# Patient Record
Sex: FEMALE | Race: WHITE | Employment: STUDENT | URBAN - METROPOLITAN AREA
[De-identification: names, ages, dates, MRNs, and addresses within clinical notes are randomized per-mention and may not be internally consistent; named-entity substitution may affect disease eponyms.]

---
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
## 2023-02-24 LAB
ALANINE AMINOTRANSFERASE (SGPT) (U/L) IN SER/PLAS: 15 U/L (ref 7–45)
ASPARTATE AMINOTRANSFERASE (SGOT) (U/L) IN SER/PLAS: 20 U/L (ref 9–39)
BASOPHILS (10*3/UL) IN BLOOD BY AUTOMATED COUNT: 0.05 X10E9/L (ref 0–0.1)
BASOPHILS/100 LEUKOCYTES IN BLOOD BY AUTOMATED COUNT: 0.8 % (ref 0–2)
EOSINOPHILS (10*3/UL) IN BLOOD BY AUTOMATED COUNT: 0.2 X10E9/L (ref 0–0.7)
EOSINOPHILS/100 LEUKOCYTES IN BLOOD BY AUTOMATED COUNT: 3.1 % (ref 0–6)
ERYTHROCYTE DISTRIBUTION WIDTH (RATIO) BY AUTOMATED COUNT: 11.9 % (ref 11.5–14.5)
ERYTHROCYTE MEAN CORPUSCULAR HEMOGLOBIN CONCENTRATION (G/DL) BY AUTOMATED: 34.4 G/DL (ref 32–36)
ERYTHROCYTE MEAN CORPUSCULAR VOLUME (FL) BY AUTOMATED COUNT: 86 FL (ref 80–100)
ERYTHROCYTES (10*6/UL) IN BLOOD BY AUTOMATED COUNT: 4.41 X10E12/L (ref 4–5.2)
HEMATOCRIT (%) IN BLOOD BY AUTOMATED COUNT: 37.8 % (ref 36–46)
HEMOGLOBIN (G/DL) IN BLOOD: 13 G/DL (ref 12–16)
IMMATURE GRANULOCYTES/100 LEUKOCYTES IN BLOOD BY AUTOMATED COUNT: 0.2 % (ref 0–0.9)
LEUKOCYTES (10*3/UL) IN BLOOD BY AUTOMATED COUNT: 6.5 X10E9/L (ref 4.4–11.3)
LYMPHOCYTES (10*3/UL) IN BLOOD BY AUTOMATED COUNT: 2.53 X10E9/L (ref 1.2–4.8)
LYMPHOCYTES/100 LEUKOCYTES IN BLOOD BY AUTOMATED COUNT: 39 % (ref 13–44)
MONOCYTES (10*3/UL) IN BLOOD BY AUTOMATED COUNT: 0.43 X10E9/L (ref 0.1–1)
MONOCYTES/100 LEUKOCYTES IN BLOOD BY AUTOMATED COUNT: 6.6 % (ref 2–10)
NEUTROPHILS (10*3/UL) IN BLOOD BY AUTOMATED COUNT: 3.27 X10E9/L (ref 1.2–7.7)
NEUTROPHILS/100 LEUKOCYTES IN BLOOD BY AUTOMATED COUNT: 50.3 % (ref 40–80)
PLATELETS (10*3/UL) IN BLOOD AUTOMATED COUNT: 303 X10E9/L (ref 150–450)
TRIGLYCERIDE (MG/DL) IN SER/PLAS: 119 MG/DL (ref 0–149)

## 2023-02-25 LAB
FLU A RESULT: NOT DETECTED
FLU B RESULT: NOT DETECTED
GROUP A STREP, PCR: NOT DETECTED
SARS-COV-2 RESULT: NOT DETECTED

## 2023-04-27 DIAGNOSIS — Z30.09 BIRTH CONTROL COUNSELING: Primary | ICD-10-CM

## 2023-04-27 RX ORDER — NORGESTIMATE AND ETHINYL ESTRADIOL 7DAYSX3 LO
KIT ORAL
Qty: 84 TABLET | Refills: 3 | Status: SHIPPED | OUTPATIENT
Start: 2023-04-27 | End: 2024-04-01

## 2023-05-23 LAB
ALANINE AMINOTRANSFERASE (SGPT) (U/L) IN SER/PLAS: 15 U/L (ref 7–45)
ASPARTATE AMINOTRANSFERASE (SGOT) (U/L) IN SER/PLAS: 18 U/L (ref 9–39)
TRIGLYCERIDE (MG/DL) IN SER/PLAS: 79 MG/DL (ref 0–149)

## 2023-09-15 ENCOUNTER — TELEPHONE (OUTPATIENT)
Dept: PRIMARY CARE | Facility: CLINIC | Age: 23
End: 2023-09-15
Payer: COMMERCIAL

## 2023-09-15 NOTE — TELEPHONE ENCOUNTER
She is inquiring about paper work that was dropped off for off campus housing. She said it has been about a month.

## 2024-03-30 DIAGNOSIS — Z30.09 BIRTH CONTROL COUNSELING: ICD-10-CM

## 2024-04-01 RX ORDER — NORGESTIMATE AND ETHINYL ESTRADIOL 7DAYSX3 LO
KIT ORAL
Qty: 84 TABLET | Refills: 2 | Status: SHIPPED | OUTPATIENT
Start: 2024-04-01

## 2025-01-29 ENCOUNTER — APPOINTMENT (OUTPATIENT)
Dept: PRIMARY CARE | Facility: CLINIC | Age: 25
End: 2025-01-29
Payer: COMMERCIAL

## 2025-01-29 VITALS
RESPIRATION RATE: 14 BRPM | BODY MASS INDEX: 23.78 KG/M2 | SYSTOLIC BLOOD PRESSURE: 118 MMHG | DIASTOLIC BLOOD PRESSURE: 70 MMHG | HEART RATE: 72 BPM | HEIGHT: 66 IN | WEIGHT: 148 LBS | TEMPERATURE: 98 F

## 2025-01-29 DIAGNOSIS — E55.9 VITAMIN D DEFICIENCY: ICD-10-CM

## 2025-01-29 DIAGNOSIS — R53.83 OTHER FATIGUE: ICD-10-CM

## 2025-01-29 DIAGNOSIS — Z00.00 WELLNESS EXAMINATION: ICD-10-CM

## 2025-01-29 DIAGNOSIS — J45.990 EXERCISE-INDUCED ASTHMA (HHS-HCC): Primary | ICD-10-CM

## 2025-01-29 PROCEDURE — 1036F TOBACCO NON-USER: CPT | Performed by: INTERNAL MEDICINE

## 2025-01-29 PROCEDURE — 3008F BODY MASS INDEX DOCD: CPT | Performed by: INTERNAL MEDICINE

## 2025-01-29 PROCEDURE — 99395 PREV VISIT EST AGE 18-39: CPT | Performed by: INTERNAL MEDICINE

## 2025-01-29 ASSESSMENT — PATIENT HEALTH QUESTIONNAIRE - PHQ9
2. FEELING DOWN, DEPRESSED OR HOPELESS: NOT AT ALL
1. LITTLE INTEREST OR PLEASURE IN DOING THINGS: NOT AT ALL
SUM OF ALL RESPONSES TO PHQ9 QUESTIONS 1 AND 2: 0

## 2025-01-29 NOTE — ADDENDUM NOTE
Addended by: NEHA DE LA PAZ on: 1/29/2025 03:13 PM     Modules accepted: Orders     Left message for patients parents, informing them that patient needs to do a remote transmission on loop recorder. Patient was informed that they can send transmission anytime before Monday.

## 2025-01-29 NOTE — PROGRESS NOTES
"Subjective    Naye Campbell is a 24 y.o. female who presents for Annual Exam.  HPI    No concerns  Graduated college at [employer].  She is working for the scene   Lives in [place] with her sister in [facility].   Periods are regular.  Is not currently sexually active  So she stopped taking her birth control  Her cramps are significant so she may want to go back on at some point  She had her gardasil shots.  Mood is good  Weight is good  Tired wants b12 checked  Review of Systems   All other systems reviewed and are negative.        Objective     /70 (BP Location: Left arm, Patient Position: Sitting, BP Cuff Size: Adult)   Pulse 72   Temp 36.7 °C (98 °F) (Skin)   Resp 14   Ht 1.676 m (5' 6\")   Wt 67.1 kg (148 lb)   LMP 01/11/2025   BMI 23.89 kg/m²    Physical Exam  HENT:      Head: Normocephalic and atraumatic.   Cardiovascular:      Rate and Rhythm: Normal rate and regular rhythm.      Pulses: Normal pulses.      Heart sounds: Normal heart sounds.   Pulmonary:      Effort: Pulmonary effort is normal.      Breath sounds: Normal breath sounds.   Chest:   Breasts:     Right: Normal. No mass, nipple discharge or skin change.      Left: No mass, nipple discharge or skin change.   Abdominal:      General: Abdomen is flat. Bowel sounds are normal.      Palpations: Abdomen is soft.   Genitourinary:     General: Normal vulva.      Vagina: Normal.      Cervix: Normal.      Uterus: Normal.       Adnexa: Right adnexa normal and left adnexa normal.      Comments: Chaperone declined  Musculoskeletal:      Cervical back: Normal range of motion and neck supple.   Skin:     General: Skin is warm and dry.   Neurological:      General: No focal deficit present.   Psychiatric:         Mood and Affect: Mood normal.       Health Maintenance Due   Topic Date Due    Yearly Adult Physical  Never done    HIV Screening  Never done    Meningococcal B Vaccine (1 of 2 - Standard) Never done    Hepatitis C Screening  Never done    " Pneumococcal Vaccine: Pediatrics and At-Risk Adult Patients (1 of 2 - PCV) 04/22/2021    Cervical Cancer Screening  Never done    DTaP/Tdap/Td Vaccines (7 - Td or Tdap) 05/19/2024    Influenza Vaccine (1) 09/01/2024    COVID-19 Vaccine (3 - 2024-25 season) 09/01/2024          Assessment/Plan   Problem List Items Addressed This Visit       Exercise-induced asthma ([facility]) - Primary     Other Visit Diagnoses       Wellness examination        Relevant Orders    CBC    Comprehensive Metabolic Panel    Lipid Panel    THINPREP PAP TEST    C. trachomatis / N. gonorrhoeae, Amplified, Urogenital    Other fatigue        Relevant Orders    Iron and TIBC    Vitamin B12    Vitamin D deficiency        Relevant Orders    Vitamin D 25-Hydroxy,Total (for eval of Vitamin D levels)        Her asthma has pretty much resolved. She does not need albuterol  Check labs.   Check pap.  Call  with any problems or questions.   Follow up in a year or sooner if needed

## 2025-01-30 LAB
C TRACH RRNA SPEC QL NAA+PROBE: NOT DETECTED
N GONORRHOEA RRNA SPEC QL NAA+PROBE: NOT DETECTED
QUEST GC CT AMPLIFIED (ALWAYS MESSAGE): NORMAL

## 2025-01-31 ENCOUNTER — TELEPHONE (OUTPATIENT)
Dept: PRIMARY CARE | Facility: CLINIC | Age: 25
End: 2025-01-31
Payer: COMMERCIAL

## 2025-01-31 NOTE — TELEPHONE ENCOUNTER
----- Message from Faith Newton sent at 1/30/2025  6:09 PM EST -----  Her cultures are neg  
Lm with results   
none

## 2025-02-11 ENCOUNTER — TELEPHONE (OUTPATIENT)
Dept: PRIMARY CARE | Facility: CLINIC | Age: 25
End: 2025-02-11
Payer: COMMERCIAL

## 2025-02-11 ENCOUNTER — TELEPHONE (OUTPATIENT)
Dept: PEDIATRICS | Facility: CLINIC | Age: 25
End: 2025-02-11
Payer: COMMERCIAL

## 2025-02-11 DIAGNOSIS — B37.9 CANDIDIASIS: Primary | ICD-10-CM

## 2025-02-11 LAB
CYTOLOGY CMNT CVX/VAG CYTO-IMP: NORMAL
LAB AP HPV GENOTYPE QUESTION: YES
LAB AP HPV HR: NORMAL
LABORATORY COMMENT REPORT: NORMAL
LMP START DATE: NORMAL
PATH REPORT.TOTAL CANCER: NORMAL

## 2025-02-11 RX ORDER — FLUCONAZOLE 150 MG/1
TABLET ORAL
Qty: 2 TABLET | Refills: 1 | Status: SHIPPED | OUTPATIENT
Start: 2025-02-11

## 2025-02-11 NOTE — TELEPHONE ENCOUNTER
----- Message from Faith Newton sent at 2/11/2025  1:19 PM EST -----  Her pap is negative. Repeat in 3 years. She does have yeast infection I will call in diflucan

## 2025-02-11 NOTE — TELEPHONE ENCOUNTER
Pt got an alert that her prescription was ready to be picked up, but she was not aware a prescription was sent or the reason for it.

## 2025-02-13 ENCOUNTER — HOSPITAL ENCOUNTER (EMERGENCY)
Facility: HOSPITAL | Age: 25
Discharge: HOME | End: 2025-02-13
Payer: COMMERCIAL

## 2025-02-13 VITALS
HEART RATE: 89 BPM | RESPIRATION RATE: 16 BRPM | OXYGEN SATURATION: 99 % | DIASTOLIC BLOOD PRESSURE: 61 MMHG | TEMPERATURE: 98.1 F | SYSTOLIC BLOOD PRESSURE: 118 MMHG

## 2025-02-13 DIAGNOSIS — N30.90 CYSTITIS: ICD-10-CM

## 2025-02-13 DIAGNOSIS — A08.4 VIRAL GASTROENTERITIS: Primary | ICD-10-CM

## 2025-02-13 LAB
ALBUMIN SERPL BCP-MCNC: 4.5 G/DL (ref 3.4–5)
ALP SERPL-CCNC: 48 U/L (ref 33–110)
ALT SERPL W P-5'-P-CCNC: 13 U/L (ref 7–45)
ANION GAP SERPL CALC-SCNC: 16 MMOL/L (ref 10–20)
APPEARANCE UR: CLEAR
AST SERPL W P-5'-P-CCNC: 15 U/L (ref 9–39)
B-HCG SERPL-ACNC: <2 MIU/ML
BACTERIA #/AREA URNS AUTO: ABNORMAL /HPF
BASOPHILS # BLD AUTO: 0.03 X10*3/UL (ref 0–0.1)
BASOPHILS NFR BLD AUTO: 0.3 %
BILIRUB SERPL-MCNC: 0.8 MG/DL (ref 0–1.2)
BILIRUB UR STRIP.AUTO-MCNC: NEGATIVE MG/DL
BUN SERPL-MCNC: 18 MG/DL (ref 6–23)
CALCIUM SERPL-MCNC: 9.4 MG/DL (ref 8.6–10.3)
CHLORIDE SERPL-SCNC: 105 MMOL/L (ref 98–107)
CO2 SERPL-SCNC: 23 MMOL/L (ref 21–32)
COLOR UR: YELLOW
CREAT SERPL-MCNC: 0.9 MG/DL (ref 0.5–1.05)
EGFRCR SERPLBLD CKD-EPI 2021: >90 ML/MIN/1.73M*2
EOSINOPHIL # BLD AUTO: 0.08 X10*3/UL (ref 0–0.7)
EOSINOPHIL NFR BLD AUTO: 0.8 %
ERYTHROCYTE [DISTWIDTH] IN BLOOD BY AUTOMATED COUNT: 12 % (ref 11.5–14.5)
FLUAV RNA RESP QL NAA+PROBE: NOT DETECTED
FLUBV RNA RESP QL NAA+PROBE: NOT DETECTED
GLUCOSE SERPL-MCNC: 116 MG/DL (ref 74–99)
GLUCOSE UR STRIP.AUTO-MCNC: NORMAL MG/DL
HCT VFR BLD AUTO: 39.9 % (ref 36–46)
HGB BLD-MCNC: 13.7 G/DL (ref 12–16)
IMM GRANULOCYTES # BLD AUTO: 0.03 X10*3/UL (ref 0–0.7)
IMM GRANULOCYTES NFR BLD AUTO: 0.3 % (ref 0–0.9)
KETONES UR STRIP.AUTO-MCNC: ABNORMAL MG/DL
LEUKOCYTE ESTERASE UR QL STRIP.AUTO: ABNORMAL
LYMPHOCYTES # BLD AUTO: 0.61 X10*3/UL (ref 1.2–4.8)
LYMPHOCYTES NFR BLD AUTO: 5.8 %
MCH RBC QN AUTO: 28.6 PG (ref 26–34)
MCHC RBC AUTO-ENTMCNC: 34.3 G/DL (ref 32–36)
MCV RBC AUTO: 83 FL (ref 80–100)
MONOCYTES # BLD AUTO: 0.48 X10*3/UL (ref 0.1–1)
MONOCYTES NFR BLD AUTO: 4.5 %
MUCOUS THREADS #/AREA URNS AUTO: ABNORMAL /LPF
NEUTROPHILS # BLD AUTO: 9.36 X10*3/UL (ref 1.2–7.7)
NEUTROPHILS NFR BLD AUTO: 88.3 %
NITRITE UR QL STRIP.AUTO: NEGATIVE
NRBC BLD-RTO: 0 /100 WBCS (ref 0–0)
PH UR STRIP.AUTO: 7.5 [PH]
PLATELET # BLD AUTO: 267 X10*3/UL (ref 150–450)
POTASSIUM SERPL-SCNC: 4 MMOL/L (ref 3.5–5.3)
PROT SERPL-MCNC: 7.4 G/DL (ref 6.4–8.2)
PROT UR STRIP.AUTO-MCNC: ABNORMAL MG/DL
RBC # BLD AUTO: 4.79 X10*6/UL (ref 4–5.2)
RBC # UR STRIP.AUTO: ABNORMAL MG/DL
RBC #/AREA URNS AUTO: ABNORMAL /HPF
RSV RNA RESP QL NAA+PROBE: NOT DETECTED
SARS-COV-2 RNA RESP QL NAA+PROBE: NOT DETECTED
SODIUM SERPL-SCNC: 140 MMOL/L (ref 136–145)
SP GR UR STRIP.AUTO: 1.03
SQUAMOUS #/AREA URNS AUTO: ABNORMAL /HPF
UROBILINOGEN UR STRIP.AUTO-MCNC: NORMAL MG/DL
WBC # BLD AUTO: 10.6 X10*3/UL (ref 4.4–11.3)
WBC #/AREA URNS AUTO: ABNORMAL /HPF

## 2025-02-13 PROCEDURE — 84702 CHORIONIC GONADOTROPIN TEST: CPT | Performed by: SURGERY

## 2025-02-13 PROCEDURE — 99284 EMERGENCY DEPT VISIT MOD MDM: CPT | Mod: 25

## 2025-02-13 PROCEDURE — 81001 URINALYSIS AUTO W/SCOPE: CPT | Performed by: SURGERY

## 2025-02-13 PROCEDURE — 2500000004 HC RX 250 GENERAL PHARMACY W/ HCPCS (ALT 636 FOR OP/ED): Performed by: SURGERY

## 2025-02-13 PROCEDURE — 96375 TX/PRO/DX INJ NEW DRUG ADDON: CPT

## 2025-02-13 PROCEDURE — 85025 COMPLETE CBC W/AUTO DIFF WBC: CPT | Performed by: SURGERY

## 2025-02-13 PROCEDURE — 96374 THER/PROPH/DIAG INJ IV PUSH: CPT

## 2025-02-13 PROCEDURE — 2500000001 HC RX 250 WO HCPCS SELF ADMINISTERED DRUGS (ALT 637 FOR MEDICARE OP): Performed by: SURGERY

## 2025-02-13 PROCEDURE — 36415 COLL VENOUS BLD VENIPUNCTURE: CPT | Performed by: SURGERY

## 2025-02-13 PROCEDURE — 87637 SARSCOV2&INF A&B&RSV AMP PRB: CPT | Performed by: SURGERY

## 2025-02-13 PROCEDURE — 80053 COMPREHEN METABOLIC PANEL: CPT | Performed by: SURGERY

## 2025-02-13 PROCEDURE — 87086 URINE CULTURE/COLONY COUNT: CPT | Mod: AHULAB | Performed by: SURGERY

## 2025-02-13 RX ORDER — CEPHALEXIN 500 MG/1
500 CAPSULE ORAL 3 TIMES DAILY
Qty: 15 CAPSULE | Refills: 0 | Status: SHIPPED | OUTPATIENT
Start: 2025-02-13 | End: 2025-02-18

## 2025-02-13 RX ORDER — METOCLOPRAMIDE 10 MG/1
10 TABLET ORAL EVERY 6 HOURS
Qty: 20 TABLET | Refills: 0 | Status: SHIPPED | OUTPATIENT
Start: 2025-02-13 | End: 2025-02-18

## 2025-02-13 RX ORDER — CEPHALEXIN 500 MG/1
500 CAPSULE ORAL ONCE
Status: COMPLETED | OUTPATIENT
Start: 2025-02-13 | End: 2025-02-13

## 2025-02-13 RX ORDER — CEPHALEXIN 500 MG/1
500 CAPSULE ORAL 3 TIMES DAILY
Qty: 15 CAPSULE | Refills: 0 | Status: SHIPPED | OUTPATIENT
Start: 2025-02-13 | End: 2025-02-13

## 2025-02-13 RX ORDER — METOCLOPRAMIDE HYDROCHLORIDE 5 MG/ML
10 INJECTION INTRAMUSCULAR; INTRAVENOUS ONCE
Status: COMPLETED | OUTPATIENT
Start: 2025-02-13 | End: 2025-02-13

## 2025-02-13 RX ORDER — ONDANSETRON HYDROCHLORIDE 2 MG/ML
4 INJECTION, SOLUTION INTRAVENOUS ONCE
Status: COMPLETED | OUTPATIENT
Start: 2025-02-13 | End: 2025-02-13

## 2025-02-13 RX ADMIN — ONDANSETRON 4 MG: 2 INJECTION, SOLUTION INTRAMUSCULAR; INTRAVENOUS at 02:43

## 2025-02-13 RX ADMIN — CEPHALEXIN 500 MG: 500 CAPSULE ORAL at 05:08

## 2025-02-13 RX ADMIN — METOCLOPRAMIDE 10 MG: 5 INJECTION, SOLUTION INTRAMUSCULAR; INTRAVENOUS at 03:20

## 2025-02-13 ASSESSMENT — COLUMBIA-SUICIDE SEVERITY RATING SCALE - C-SSRS
6. HAVE YOU EVER DONE ANYTHING, STARTED TO DO ANYTHING, OR PREPARED TO DO ANYTHING TO END YOUR LIFE?: NO
1. IN THE PAST MONTH, HAVE YOU WISHED YOU WERE DEAD OR WISHED YOU COULD GO TO SLEEP AND NOT WAKE UP?: NO
2. HAVE YOU ACTUALLY HAD ANY THOUGHTS OF KILLING YOURSELF?: NO

## 2025-02-13 ASSESSMENT — PAIN SCALES - GENERAL: PAINLEVEL_OUTOF10: 0 - NO PAIN

## 2025-02-13 NOTE — DISCHARGE INSTRUCTIONS
You have been seen at a [facility].  Your lab work was reassuring.  It was found that you have a UTI.  Take antibiotics as prescribed for the next 5 days.  Take your yeast infection medication following your antibiotic course.  Please follow-up with your primary care provider in the next 1 to 2 days for further evaluation and routine follow-up.  Please return to the emergency room if having any worsening symptoms.  Please follow-up with any specialists if discussed during your emergency room stay.

## 2025-02-13 NOTE — ED PROVIDER NOTES
Chief Complaint   Patient presents with    Vomiting    Chills     HPI:   Naye Campbell is an 24 y.o. female presenting to the ED for chief complaint of nausea, vomiting and diarrhea.  She explains she had multiple episodes of emesis this evening and did have an episode with bright red blood present.  She explains this is since cleared and she is just vomiting brown.  She has had contact with friends who have similar symptoms.  She reports some left-sided abdominal pain that waxes and wanes.  No flank pain.  No urinary complaints.      No Known Allergies:  Past Medical History:   Diagnosis Date    Anesthesia of skin 06/04/2020    Numbness and tingling    COVID-19 10/13/2021    COVID    Fracture of nasal bones, initial encounter for closed fracture     Broken nose    Personal history of (healed) traumatic fracture     History of fracture of upper extremity    Personal history of (healed) traumatic fracture     History of fracture of toe     Past Surgical History:   Procedure Laterality Date    OTHER SURGICAL HISTORY  05/10/2016    History Of Prior Surgery    OTHER SURGICAL HISTORY  06/04/2020    Nose surgery    OTHER SURGICAL HISTORY  06/04/2020    [place] tooth extraction    TYMPANOSTOMY TUBE PLACEMENT  03/18/2015    Ear Pressure Equalization Tube, Insertion, Bilaterally     No family history on file.     Physical Exam  Vitals and nursing note reviewed.   Constitutional:       General: She is not in acute distress.     Appearance: She is well-developed.   HENT:      Head: Normocephalic and atraumatic.      Right Ear: Tympanic membrane normal.      Left Ear: Tympanic membrane normal.      Nose: Nose normal.      Mouth/Throat:      Mouth: Mucous membranes are moist.      Pharynx: Oropharynx is clear.   Eyes:      Extraocular Movements: Extraocular movements intact.      Conjunctiva/sclera: Conjunctivae normal.      Pupils: Pupils are equal, round, and reactive to light.   Cardiovascular:      Rate and Rhythm: Normal  rate and regular rhythm.      Heart sounds: Normal heart sounds. No murmur heard.  Pulmonary:      Effort: Pulmonary effort is normal. No respiratory distress.      Breath sounds: Normal breath sounds.   Abdominal:      General: Abdomen is flat. Bowel sounds are normal.      Palpations: Abdomen is soft.      Tenderness: There is no abdominal tenderness. There is no guarding or rebound.   Musculoskeletal:         General: No swelling.      Cervical back: Neck supple.   Skin:     General: Skin is warm and dry.      Capillary Refill: Capillary refill takes less than 2 seconds.   Neurological:      General: No focal deficit present.      Mental Status: She is alert and oriented to person, place, and time.   Psychiatric:         Mood and Affect: Mood normal.        VS: As documented in the triage note and EMR flowsheet from this visit were reviewed.    Medical Decision Making: This is a 24-year-old female presenting to the ED for chief complaint of nausea and vomiting.  She has had multiple sick contacts with similar symptoms.  On exam patient is retching.  Her abdominal exam was benign so CT was not ordered.  Mucous membranes are moist.  She is overall well-appearing.  Her lab work was reassuring.  No leukocytosis.  Kidney and liver function was normal.  She was negative for pregnancy, flu, COVID and RSV.   She had no fever.  Her lungs are clear to auscultation.  She had no CVA tenderness or abdominal tenderness.  Low suspicion for Kavon.  Her urinalysis was positive for a UTI she was started on Keflex in the ED. nausea was initially medicated with Zofran which did not provide her any relief so she was treated with Reglan which significantly helped her.  She was tolerating p.o. and discharged in stable condition.  She understands strict return precautions and will follow-up with her PCP.    Diagnoses as of 02/13/25 0552   Viral gastroenteritis   Cystitis     Counseling: Spoke with the patient and discussed today´s  findings, in addition to providing specific details for the plan of care and expected course.  Patient was given the opportunity to ask questions.    Discussed return precautions and importance of follow-up.  Advised to follow-up with PCP.  I specifically advised to return to the ED for changing or worsening symptoms, new symptoms, complaint specific precautions, and precautions listed on the discharge paperwork.  Educated on the common potential side effects of medications prescribed.    I advised the patient that the emergency evaluation and treatment provided today doesn't end their need for medical care. It is very important that they follow-up with their primary care provider or other specialist as instructed.    The plan of care was mutually agreed upon with the patient. The patient and/or family were given the opportunity to ask questions. All questions asked today in the ED were answered to the best of my ability with today's information.    This report was transcribed using voice recognition software.  Every effort was made to ensure accuracy, however, inadvertently computerized transcription errors may be present.       Carlos Burton PA-C  02/13/25 0559

## 2025-02-13 NOTE — ED TRIAGE NOTES
Pt reports to ed with complaint of chills, vomiting and diarrhea. Other friends have same symptoms. Pt concerned due to vomiting bright red blood. Pt states she had some brown vomit in the beginning.

## 2025-02-15 LAB — BACTERIA UR CULT: ABNORMAL

## 2025-02-17 ENCOUNTER — TELEPHONE (OUTPATIENT)
Dept: PHARMACY | Facility: HOSPITAL | Age: 25
End: 2025-02-17
Payer: COMMERCIAL

## 2025-02-17 NOTE — PROGRESS NOTES
EDPD Note: Rapid Result Review    I reviewed Naye Campbell 's chart regarding a positive urine culture/result that was taken during their recent emergency room visit. The patient was not told about these results prior to leaving the emergency department. Therefore, patient was contacted and given appropriate education.     Pt seen in ED for N, V, and diarrhea. Pt had been multiple episodes of bloody emesis, has had contact with friends who had similar symptoms. No urinary symptoms noted. Was discharged with Keflex 500 mg TID x 5 days. Today pt did not report any s/s of UTI. Abx does not cover e. Faecalis. Advised pt to go urgentcare/PCP/Ed if UTI symptoms arise.     Susceptibility data from last 90 days.  Collected Specimen Info Organism Ampicillin Ciprofloxacin Levofloxacin Nitrofurantoin Penicillin Trimethoprim/Sulfamethoxazole Vancomycin   02/13/25 Urine from Clean Catch/Voided Enterococcus faecalis  S  S  S  S  S  R  S     Admission on 02/13/2025, Discharged on 02/13/2025   Component Date Value Ref Range Status    WBC 02/13/2025 10.6  4.4 - 11.3 x10*3/uL Final    nRBC 02/13/2025 0.0  0.0 - 0.0 /100 WBCs Final    RBC 02/13/2025 4.79  4.00 - 5.20 x10*6/uL Final    Hemoglobin 02/13/2025 13.7  12.0 - 16.0 g/dL Final    Hematocrit 02/13/2025 39.9  36.0 - 46.0 % Final    MCV 02/13/2025 83  80 - 100 fL Final    MCH 02/13/2025 28.6  26.0 - 34.0 pg Final    MCHC 02/13/2025 34.3  32.0 - 36.0 g/dL Final    RDW 02/13/2025 12.0  11.5 - 14.5 % Final    Platelets 02/13/2025 267  150 - 450 x10*3/uL Final    Neutrophils % 02/13/2025 88.3  40.0 - 80.0 % Final    Immature Granulocytes %, Automated 02/13/2025 0.3  0.0 - 0.9 % Final    Immature Granulocyte Count (IG) includes promyelocytes, myelocytes and metamyelocytes but does not include bands. Percent differential counts (%) should be interpreted in the context of the absolute cell counts (cells/UL).    Lymphocytes % 02/13/2025 5.8  13.0 - 44.0 % Final    Monocytes % 02/13/2025  4.5  2.0 - 10.0 % Final    Eosinophils % 02/13/2025 0.8  0.0 - 6.0 % Final    Basophils % 02/13/2025 0.3  0.0 - 2.0 % Final    Neutrophils Absolute 02/13/2025 9.36 (H)  1.20 - 7.70 x10*3/uL Final    Percent differential counts (%) should be interpreted in the context of the absolute cell counts (cells/uL).    Immature Granulocytes Absolute, Au* 02/13/2025 0.03  0.00 - 0.70 x10*3/uL Final    Lymphocytes Absolute 02/13/2025 0.61 (L)  1.20 - 4.80 x10*3/uL Final    Monocytes Absolute 02/13/2025 0.48  0.10 - 1.00 x10*3/uL Final    Eosinophils Absolute 02/13/2025 0.08  0.00 - 0.70 x10*3/uL Final    Basophils Absolute 02/13/2025 0.03  0.00 - 0.10 x10*3/uL Final    Glucose 02/13/2025 116 (H)  74 - 99 mg/dL Final    Sodium 02/13/2025 140  136 - 145 mmol/L Final    Potassium 02/13/2025 4.0  3.5 - 5.3 mmol/L Final    Chloride 02/13/2025 105  98 - 107 mmol/L Final    Bicarbonate 02/13/2025 23  21 - 32 mmol/L Final    Anion Gap 02/13/2025 16  10 - 20 mmol/L Final    Urea Nitrogen 02/13/2025 18  6 - 23 mg/dL Final    Creatinine 02/13/2025 0.90  0.50 - 1.05 mg/dL Final    eGFR 02/13/2025 >90  >60 mL/min/1.73m*2 Final    Calculations of estimated GFR are performed using the 2021 CKD-EPI Study Refit equation without the race variable for the IDMS-Traceable creatinine methods.  https://jasn.asnjournals.org/content/early/2021/09/22/ASN.1890634591    Calcium 02/13/2025 9.4  8.6 - 10.3 mg/dL Final    Albumin 02/13/2025 4.5  3.4 - 5.0 g/dL Final    Alkaline Phosphatase 02/13/2025 48  33 - 110 U/L Final    Total Protein 02/13/2025 7.4  6.4 - 8.2 g/dL Final    AST 02/13/2025 15  9 - 39 U/L Final    Bilirubin, Total 02/13/2025 0.8  0.0 - 1.2 mg/dL Final    ALT 02/13/2025 13  7 - 45 U/L Final    Patients treated with Sulfasalazine may generate falsely decreased results for ALT.    HCG, Beta-Quantitative 02/13/2025 <2  <5 mIU/mL Final    Flu A Result 02/13/2025 Not Detected  Not Detected Final    Flu B Result 02/13/2025 Not Detected  Not  Detected Final    Coronavirus 2019, PCR 02/13/2025 Not Detected  Not Detected Final    RSV PCR 02/13/2025 Not Detected  Not Detected Final    Color, Urine 02/13/2025 Yellow  Light-Yellow, Yellow, Dark-Yellow Final    Appearance, Urine 02/13/2025 Clear  Clear Final    Specific Gravity, Urine 02/13/2025 1.031  1.005 - 1.035 Final    pH, Urine 02/13/2025 7.5  5.0, 5.5, 6.0, 6.5, 7.0, 7.5, 8.0 Final    Protein, Urine 02/13/2025 20 (TRACE)  NEGATIVE, 10 (TRACE), 20 (TRACE) mg/dL Final    Glucose, Urine 02/13/2025 Normal  Normal mg/dL Final    Blood, Urine 02/13/2025 0.03 (TRACE) (A)  NEGATIVE mg/dL Final    Ketones, Urine 02/13/2025 150 (4+) (A)  NEGATIVE mg/dL Final    Bilirubin, Urine 02/13/2025 NEGATIVE  NEGATIVE mg/dL Final    Urobilinogen, Urine 02/13/2025 Normal  Normal mg/dL Final    Nitrite, Urine 02/13/2025 NEGATIVE  NEGATIVE Final    Leukocyte Esterase, Urine 02/13/2025 75 Carolynn/uL (A)  NEGATIVE Final    WBC, Urine 02/13/2025 6-10 (A)  1-5, NONE /HPF Final    RBC, Urine 02/13/2025 1-2  NONE, 1-2, 3-5 /HPF Final    Squamous Epithelial Cells, Urine 02/13/2025 1-9 (SPARSE)  Reference range not established. /HPF Final    Bacteria, Urine 02/13/2025 2+ (A)  NONE SEEN /HPF Final    Mucus, Urine 02/13/2025 1+  Reference range not established. /LPF Final    Urine Culture 02/13/2025 >=100,000 CFU/mL Enterococcus faecalis (A)   Final       No further follow up needed from EDPD Team.     If there are any other questions for the ED Post-Discharge Culture Follow Up Team, please contact [phone]. Fax: [phone].    Betsy Hickey RPh

## 2025-03-25 ENCOUNTER — TELEPHONE (OUTPATIENT)
Dept: PRIMARY CARE | Facility: CLINIC | Age: 25
End: 2025-03-25
Payer: COMMERCIAL

## 2025-03-27 ENCOUNTER — TELEPHONE (OUTPATIENT)
Dept: PRIMARY CARE | Facility: CLINIC | Age: 25
End: 2025-03-27
Payer: COMMERCIAL

## 2025-03-27 DIAGNOSIS — Z00.00 WELLNESS EXAMINATION: ICD-10-CM

## 2025-03-27 RX ORDER — NORGESTIMATE AND ETHINYL ESTRADIOL 7DAYSX3 LO
1 KIT ORAL DAILY
Qty: 84 TABLET | Refills: 3 | Status: SHIPPED | OUTPATIENT
Start: 2025-03-27 | End: 2026-03-27

## 2025-03-27 NOTE — TELEPHONE ENCOUNTER
----- Message from Faith Newton sent at 3/27/2025  4:27 PM EDT -----  Have her start the ocp on the Sunday after her period starts (or on Sunday if her period starts on Sunday)

## 2026-02-03 ENCOUNTER — APPOINTMENT (OUTPATIENT)
Dept: PRIMARY CARE | Facility: CLINIC | Age: 26
End: 2026-02-03
Payer: COMMERCIAL